# Patient Record
Sex: FEMALE | Race: WHITE | NOT HISPANIC OR LATINO | Employment: FULL TIME | URBAN - METROPOLITAN AREA
[De-identification: names, ages, dates, MRNs, and addresses within clinical notes are randomized per-mention and may not be internally consistent; named-entity substitution may affect disease eponyms.]

---

## 2023-08-22 LAB
ALANINE AMINOTRANSFERASE (SGPT) (U/L) IN SER/PLAS: 19 U/L (ref 7–45)
ALBUMIN (G/DL) IN SER/PLAS: 4.1 G/DL (ref 3.4–5)
ALKALINE PHOSPHATASE (U/L) IN SER/PLAS: 62 U/L (ref 33–110)
ANION GAP IN SER/PLAS: 12 MMOL/L (ref 10–20)
APPEARANCE, URINE: CLEAR
ASPARTATE AMINOTRANSFERASE (SGOT) (U/L) IN SER/PLAS: 20 U/L (ref 9–39)
BACTERIA, URINE: ABNORMAL /HPF
BASOPHILS (10*3/UL) IN BLOOD BY AUTOMATED COUNT: 0.08 X10E9/L (ref 0–0.1)
BASOPHILS/100 LEUKOCYTES IN BLOOD BY AUTOMATED COUNT: 1 % (ref 0–2)
BILIRUBIN TOTAL (MG/DL) IN SER/PLAS: 0.4 MG/DL (ref 0–1.2)
BILIRUBIN, URINE: NEGATIVE
BLOOD, URINE: NEGATIVE
CALCIDIOL (25 OH VITAMIN D3) (NG/ML) IN SER/PLAS: 35 NG/ML
CALCIUM (MG/DL) IN SER/PLAS: 9.3 MG/DL (ref 8.6–10.3)
CARBON DIOXIDE, TOTAL (MMOL/L) IN SER/PLAS: 24 MMOL/L (ref 21–32)
CHLORIDE (MMOL/L) IN SER/PLAS: 106 MMOL/L (ref 98–107)
CHOLESTEROL (MG/DL) IN SER/PLAS: 250 MG/DL (ref 0–199)
CHOLESTEROL IN HDL (MG/DL) IN SER/PLAS: 66.5 MG/DL
CHOLESTEROL/HDL RATIO: 3.8
COBALAMIN (VITAMIN B12) (PG/ML) IN SER/PLAS: 695 PG/ML (ref 211–911)
COLOR, URINE: ABNORMAL
CREATININE (MG/DL) IN SER/PLAS: 0.71 MG/DL (ref 0.5–1.05)
EOSINOPHILS (10*3/UL) IN BLOOD BY AUTOMATED COUNT: 0.1 X10E9/L (ref 0–0.7)
EOSINOPHILS/100 LEUKOCYTES IN BLOOD BY AUTOMATED COUNT: 1.3 % (ref 0–6)
ERYTHROCYTE DISTRIBUTION WIDTH (RATIO) BY AUTOMATED COUNT: 13.5 % (ref 11.5–14.5)
ERYTHROCYTE MEAN CORPUSCULAR HEMOGLOBIN CONCENTRATION (G/DL) BY AUTOMATED: 33.6 G/DL (ref 32–36)
ERYTHROCYTE MEAN CORPUSCULAR VOLUME (FL) BY AUTOMATED COUNT: 97 FL (ref 80–100)
ERYTHROCYTES (10*6/UL) IN BLOOD BY AUTOMATED COUNT: 4.73 X10E12/L (ref 4–5.2)
GFR FEMALE: >90 ML/MIN/1.73M2
GLUCOSE (MG/DL) IN SER/PLAS: 87 MG/DL (ref 74–99)
GLUCOSE, URINE: NEGATIVE MG/DL
HEMATOCRIT (%) IN BLOOD BY AUTOMATED COUNT: 46.1 % (ref 36–46)
HEMOGLOBIN (G/DL) IN BLOOD: 15.5 G/DL (ref 12–16)
IMMATURE GRANULOCYTES/100 LEUKOCYTES IN BLOOD BY AUTOMATED COUNT: 0.1 % (ref 0–0.9)
KETONES, URINE: NEGATIVE MG/DL
LDL: 156 MG/DL (ref 0–99)
LEUKOCYTE ESTERASE, URINE: ABNORMAL
LEUKOCYTES (10*3/UL) IN BLOOD BY AUTOMATED COUNT: 7.9 X10E9/L (ref 4.4–11.3)
LYMPHOCYTES (10*3/UL) IN BLOOD BY AUTOMATED COUNT: 2.46 X10E9/L (ref 1.2–4.8)
LYMPHOCYTES/100 LEUKOCYTES IN BLOOD BY AUTOMATED COUNT: 31.1 % (ref 13–44)
MONOCYTES (10*3/UL) IN BLOOD BY AUTOMATED COUNT: 0.57 X10E9/L (ref 0.1–1)
MONOCYTES/100 LEUKOCYTES IN BLOOD BY AUTOMATED COUNT: 7.2 % (ref 2–10)
NEUTROPHILS (10*3/UL) IN BLOOD BY AUTOMATED COUNT: 4.69 X10E9/L (ref 1.2–7.7)
NEUTROPHILS/100 LEUKOCYTES IN BLOOD BY AUTOMATED COUNT: 59.3 % (ref 40–80)
NITRITE, URINE: NEGATIVE
PH, URINE: 7 (ref 5–8)
PLATELETS (10*3/UL) IN BLOOD AUTOMATED COUNT: 276 X10E9/L (ref 150–450)
POTASSIUM (MMOL/L) IN SER/PLAS: 4.3 MMOL/L (ref 3.5–5.3)
PROTEIN TOTAL: 7.3 G/DL (ref 6.4–8.2)
PROTEIN, URINE: NEGATIVE MG/DL
RBC, URINE: ABNORMAL /HPF (ref 0–5)
SODIUM (MMOL/L) IN SER/PLAS: 138 MMOL/L (ref 136–145)
SPECIFIC GRAVITY, URINE: 1.01 (ref 1–1.03)
SQUAMOUS EPITHELIAL CELLS, URINE: 2 /HPF
THYROTROPIN (MIU/L) IN SER/PLAS BY DETECTION LIMIT <= 0.05 MIU/L: 3.52 MIU/L (ref 0.44–3.98)
TRIGLYCERIDE (MG/DL) IN SER/PLAS: 139 MG/DL (ref 0–149)
UREA NITROGEN (MG/DL) IN SER/PLAS: 16 MG/DL (ref 6–23)
UROBILINOGEN, URINE: <2 MG/DL (ref 0–1.9)
VLDL: 28 MG/DL (ref 0–40)
WBC, URINE: 1 /HPF (ref 0–5)

## 2023-08-23 LAB
ESTIMATED AVERAGE GLUCOSE FOR HBA1C: 97 MG/DL
HEMOGLOBIN A1C/HEMOGLOBIN TOTAL IN BLOOD: 5 %
URINE CULTURE: NORMAL

## 2023-11-28 ENCOUNTER — OFFICE VISIT (OUTPATIENT)
Dept: PRIMARY CARE | Facility: CLINIC | Age: 43
End: 2023-11-28
Payer: COMMERCIAL

## 2023-11-28 VITALS
WEIGHT: 293 LBS | DIASTOLIC BLOOD PRESSURE: 80 MMHG | RESPIRATION RATE: 16 BRPM | SYSTOLIC BLOOD PRESSURE: 118 MMHG | TEMPERATURE: 97.2 F | HEART RATE: 76 BPM

## 2023-11-28 DIAGNOSIS — E78.5 HYPERLIPIDEMIA, UNSPECIFIED HYPERLIPIDEMIA TYPE: ICD-10-CM

## 2023-11-28 DIAGNOSIS — F41.9 ANXIETY: Primary | ICD-10-CM

## 2023-11-28 PROCEDURE — 99213 OFFICE O/P EST LOW 20 MIN: CPT | Performed by: FAMILY MEDICINE

## 2023-11-28 PROCEDURE — 1036F TOBACCO NON-USER: CPT | Performed by: FAMILY MEDICINE

## 2023-11-28 RX ORDER — DULOXETIN HYDROCHLORIDE 60 MG/1
60 CAPSULE, DELAYED RELEASE ORAL DAILY
Qty: 90 CAPSULE | Refills: 1 | Status: SHIPPED | OUTPATIENT
Start: 2023-11-28 | End: 2023-12-27 | Stop reason: SDUPTHER

## 2023-11-28 RX ORDER — DULOXETIN HYDROCHLORIDE 60 MG/1
60 CAPSULE, DELAYED RELEASE ORAL DAILY
COMMUNITY
End: 2023-11-28 | Stop reason: SDUPTHER

## 2023-11-28 RX ORDER — BUPROPION HYDROCHLORIDE 150 MG/1
150 TABLET, EXTENDED RELEASE ORAL DAILY
COMMUNITY
End: 2023-11-28 | Stop reason: SDUPTHER

## 2023-11-28 RX ORDER — BUPROPION HYDROCHLORIDE 150 MG/1
150 TABLET, EXTENDED RELEASE ORAL DAILY
Qty: 90 TABLET | Refills: 1 | Status: SHIPPED | OUTPATIENT
Start: 2023-11-28 | End: 2023-12-27 | Stop reason: SDUPTHER

## 2023-11-28 NOTE — PROGRESS NOTES
Subjective   Patient ID: Kylie Reynolds is a 43 y.o. female who presents for Follow-up (3 mon fu ).  HPI  mostly she has a anxiety anxiety issues and feeling bad.   she is worked out   she owns a gym   her therapist. wanted her to up or change meds.  she has been on the duloxetine Dr. Frances cox.     It has been tough times at home and work , she took most of October off work    Things are smoother now , meds are ok   Rough patch.     Wants to keep meds the same.     Wants to try treatment when things are work are ok.             past hx of low back pain with radiculopathy , for which she had workup including MRI and pain mgt in the past , aqutherapy helped, still following with her chiropractor.     lives with her  , does not have kids.   she has cats   ROS:    Review of Systems    History reviewed. No pertinent past medical history.    History reviewed. No pertinent surgical history.   Social History     Socioeconomic History    Marital status: Unknown     Spouse name: None    Number of children: None    Years of education: None    Highest education level: None   Occupational History    None   Tobacco Use    Smoking status: Never    Smokeless tobacco: Never   Substance and Sexual Activity    Alcohol use: None    Drug use: None    Sexual activity: None   Other Topics Concern    None   Social History Narrative    None     Social Determinants of Health     Financial Resource Strain: Not on file   Food Insecurity: Not on file   Transportation Needs: Not on file   Physical Activity: Not on file   Stress: Not on file   Social Connections: Not on file   Intimate Partner Violence: Not on file   Housing Stability: Not on file      No family history on file.    MEDICATIONS AND ALLERGIES:    ALLERGIES Penicillins    MEDICATIONS   Current Outpatient Medications on File Prior to Visit   Medication Sig Dispense Refill    buPROPion SR (Wellbutrin SR) 150 mg 12 hr tablet Take 1 tablet (150 mg) by mouth once daily.       DULoxetine (Cymbalta) 60 mg DR capsule Take 1 capsule (60 mg) by mouth once daily.       No current facility-administered medications on file prior to visit.        Objective   Visit Vitals  /80   Pulse 76   Temp 36.2 °C (97.2 °F)   Resp 16   Wt 134 kg (295 lb)   Smoking Status Never      Physical Exam  Constitutional:       Appearance: Normal appearance.   HENT:      Head: Normocephalic and atraumatic.   Eyes:      Pupils: Pupils are equal, round, and reactive to light.   Cardiovascular:      Rate and Rhythm: Normal rate.   Pulmonary:      Effort: Pulmonary effort is normal.   Musculoskeletal:         General: No swelling.      Cervical back: Normal range of motion.   Skin:     Coloration: Skin is not jaundiced.   Neurological:      General: No focal deficit present.      Mental Status: She is alert and oriented to person, place, and time.         1. Anxiety  Well controlled current treamtent   Continue current plan   Contact us for any changes.     - buPROPion SR (Wellbutrin SR) 150 mg 12 hr tablet; Take 1 tablet (150 mg) by mouth once daily.  Dispense: 90 tablet; Refill: 1  - DULoxetine (Cymbalta) 60 mg DR capsule; Take 1 capsule (60 mg) by mouth once daily.  Dispense: 90 capsule; Refill: 1      2. Hyperlipidemia, unspecified hyperlipidemia type  Lipids are high   Life style changes diucssed   Follow up in 6 months

## 2023-12-27 DIAGNOSIS — F41.9 ANXIETY: ICD-10-CM

## 2023-12-27 RX ORDER — DULOXETIN HYDROCHLORIDE 60 MG/1
60 CAPSULE, DELAYED RELEASE ORAL DAILY
Qty: 90 CAPSULE | Refills: 1 | Status: SHIPPED | OUTPATIENT
Start: 2023-12-27 | End: 2024-06-24

## 2023-12-27 RX ORDER — BUPROPION HYDROCHLORIDE 150 MG/1
150 TABLET, EXTENDED RELEASE ORAL DAILY
Qty: 90 TABLET | Refills: 1 | Status: SHIPPED | OUTPATIENT
Start: 2023-12-27 | End: 2024-06-24

## 2024-05-29 ENCOUNTER — OFFICE VISIT (OUTPATIENT)
Dept: PRIMARY CARE | Facility: CLINIC | Age: 44
End: 2024-05-29
Payer: COMMERCIAL

## 2024-05-29 VITALS
OXYGEN SATURATION: 97 % | SYSTOLIC BLOOD PRESSURE: 118 MMHG | BODY MASS INDEX: 47.09 KG/M2 | DIASTOLIC BLOOD PRESSURE: 76 MMHG | HEIGHT: 66 IN | HEART RATE: 73 BPM | WEIGHT: 293 LBS

## 2024-05-29 DIAGNOSIS — F41.9 ANXIETY: ICD-10-CM

## 2024-05-29 DIAGNOSIS — Z23 NEED FOR DIPHTHERIA-TETANUS-PERTUSSIS (TDAP) VACCINE: ICD-10-CM

## 2024-05-29 DIAGNOSIS — Z00.00 PHYSICAL EXAM: Primary | ICD-10-CM

## 2024-05-29 PROBLEM — E66.9 OBESITY: Status: ACTIVE | Noted: 2022-12-06

## 2024-05-29 PROBLEM — M47.816 LUMBAR SPONDYLOSIS: Status: ACTIVE | Noted: 2022-12-06

## 2024-05-29 PROCEDURE — 90715 TDAP VACCINE 7 YRS/> IM: CPT | Performed by: FAMILY MEDICINE

## 2024-05-29 PROCEDURE — 90471 IMMUNIZATION ADMIN: CPT | Performed by: FAMILY MEDICINE

## 2024-05-29 PROCEDURE — 99396 PREV VISIT EST AGE 40-64: CPT | Performed by: FAMILY MEDICINE

## 2024-05-29 RX ORDER — DULOXETIN HYDROCHLORIDE 30 MG/1
30 CAPSULE, DELAYED RELEASE ORAL NIGHTLY
Qty: 30 CAPSULE | Refills: 11 | Status: SHIPPED | OUTPATIENT
Start: 2024-05-29 | End: 2025-05-29

## 2024-05-29 ASSESSMENT — PROMIS GLOBAL HEALTH SCALE
RATE_GENERAL_HEALTH: VERY GOOD
EMOTIONAL_PROBLEMS: SOMETIMES
RATE_AVERAGE_PAIN: 3
RATE_MENTAL_HEALTH: EXCELLENT
CARRYOUT_SOCIAL_ACTIVITIES: EXCELLENT
RATE_AVERAGE_FATIGUE: MODERATE
RATE_SOCIAL_SATISFACTION: EXCELLENT
CARRYOUT_PHYSICAL_ACTIVITIES: COMPLETELY
RATE_QUALITY_OF_LIFE: VERY GOOD
RATE_PHYSICAL_HEALTH: FAIR

## 2024-05-29 NOTE — PROGRESS NOTES
Subjective   Patient ID: Kylie Reynolds is a 44 y.o. female who presents for Annual Exam.  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      She is still seeing her therapist.     She politely refused pregnancy test     No hx of cancer in the family.   Her paternal aunt had breast cancer.     She does not have a gynecologist.     No smoking   Occasional alcohol , last drink last year.         Review of Systems    History reviewed. No pertinent past medical history.    History reviewed. No pertinent surgical history.   Social History     Socioeconomic History    Marital status: Unknown     Spouse name: None    Number of children: None    Years of education: None    Highest education level: None   Occupational History    None   Tobacco Use    Smoking status: Never     Passive exposure: Never    Smokeless tobacco: Never   Substance and Sexual Activity    Alcohol use: Not Currently    Drug use: Never    Sexual activity: None   Other Topics Concern    None   Social History Narrative    None     Social Determinants of Health     Financial Resource Strain: Not on file   Food Insecurity: Not on file   Transportation Needs: Not on file   Physical Activity: Not on file   Stress: Not on file   Social Connections: Not on file   Intimate Partner Violence: Not on file   Housing Stability: Not on file      No family history on file.    MEDICATIONS AND ALLERGIES:    ALLERGIES Penicillins    MEDICATIONS   Current Outpatient Medications on File Prior to Visit   Medication Sig Dispense Refill    buPROPion SR (Wellbutrin SR) 150 mg 12 hr tablet Take 1 tablet (150 mg) by mouth once daily. 90 tablet 1    DULoxetine (Cymbalta) 60 mg DR capsule Take 1 capsule (60 mg) by mouth once daily. 90 capsule 1     No current facility-administered medications on file prior to visit.        Objective   Visit  "Vitals  /76   Pulse 73   Ht 1.676 m (5' 6\")   Wt 135 kg (297 lb)   SpO2 97%   BMI 47.94 kg/m²   Smoking Status Never   BSA 2.51 m²          11/28/2023     2:38 PM 5/29/2024     2:11 PM   Vitals   Systolic 118 118   Diastolic 80 76   Heart Rate 76 73   Temp 36.2 °C (97.2 °F)    Resp 16    Height (in)  1.676 m (5' 6\")   Weight (lb) 295 297   BMI  47.94 kg/m2   BSA (m2)  2.51 m2   Visit Report Report Report     Physical Exam  Constitutional:       Appearance: Normal appearance. She is normal weight.   HENT:      Head: Normocephalic.      Right Ear: Tympanic membrane normal.      Left Ear: Tympanic membrane normal.      Nose: Nose normal.      Mouth/Throat:      Pharynx: Oropharynx is clear.   Eyes:      Pupils: Pupils are equal, round, and reactive to light.   Cardiovascular:      Rate and Rhythm: Normal rate and regular rhythm.      Pulses: Normal pulses.      Heart sounds: Normal heart sounds.   Pulmonary:      Effort: Pulmonary effort is normal.      Breath sounds: Normal breath sounds. No stridor. No rhonchi.   Musculoskeletal:         General: No swelling or tenderness.   Skin:     Coloration: Skin is not jaundiced or pale.   Neurological:      General: No focal deficit present.      Mental Status: She is alert and oriented to person, place, and time. Mental status is at baseline.      Cranial Nerves: No cranial nerve deficit.      Sensory: No sensory deficit.      Motor: No weakness.      Coordination: Coordination normal.      Gait: Gait normal.      Deep Tendon Reflexes: Reflexes normal.   Psychiatric:         Mood and Affect: Mood normal.         Behavior: Behavior normal.         Thought Content: Thought content normal.         Judgment: Judgment normal.             1. Physical exam  Exam is unremarkable at this time.   Continue healthy diet and exercise, we discussed healthy lifestyle, patient is physically active however not as much as she wants due to limitation from back pain.  She was able to lose " a lot of weight previously by lifestyle changes and exercise  Routine immunizations as shown above   Influenza: advised yearly         Limiting use of alcohol, reduce or abstain from tobacco use, abstain from substance abuse    Procedures -  Continue with routine eye exams  Continue with routine dental exams  Continue with routine Dermatology / Skin checks  Continue with routine self-breast exams  Colonoscopy due:   Mammgram due:   Pap Smear / GYN due: We advised that she schedule with a gynecologist for a Pap smear, she politely refused pregnancy test today   low dose lung CT in patients who have smoked for 30 years: Not applicable    - TSH with reflex to Free T4 if abnormal; Future  - CBC and Auto Differential; Future  - Comprehensive Metabolic Panel; Future  - Hemoglobin A1C; Future  - Lipid Panel; Future    2. Anxiety  She is changing her job situation which contributes to her anxiety she is tolerating duloxetine without major side effects, will increase the dose to 60 mg in the morning and 30 mg in the afternoon  - DULoxetine (Cymbalta) 30 mg DR capsule; Take 1 capsule (30 mg) by mouth once daily at bedtime. Take in addition to the 60mg dose, total 90mg in 24 hours. Do not crush or chew.  Dispense: 30 capsule; Refill: 11    3. Need for diphtheria-tetanus-pertussis (Tdap) vaccine  Tdap given today  - Tdap vaccine, age 7 years and older (ADACEL)

## 2024-06-04 ENCOUNTER — LAB (OUTPATIENT)
Dept: LAB | Facility: LAB | Age: 44
End: 2024-06-04
Payer: COMMERCIAL

## 2024-06-04 DIAGNOSIS — Z00.00 PHYSICAL EXAM: ICD-10-CM

## 2024-06-04 LAB
ALBUMIN SERPL BCP-MCNC: 3.7 G/DL (ref 3.4–5)
ALP SERPL-CCNC: 63 U/L (ref 33–110)
ALT SERPL W P-5'-P-CCNC: 21 U/L (ref 7–45)
ANION GAP SERPL CALC-SCNC: 10 MMOL/L (ref 10–20)
AST SERPL W P-5'-P-CCNC: 22 U/L (ref 9–39)
BASOPHILS # BLD AUTO: 0.07 X10*3/UL (ref 0–0.1)
BASOPHILS NFR BLD AUTO: 1.1 %
BILIRUB SERPL-MCNC: 0.6 MG/DL (ref 0–1.2)
BUN SERPL-MCNC: 14 MG/DL (ref 6–23)
CALCIUM SERPL-MCNC: 8.6 MG/DL (ref 8.6–10.3)
CHLORIDE SERPL-SCNC: 105 MMOL/L (ref 98–107)
CHOLEST SERPL-MCNC: 229 MG/DL (ref 0–199)
CHOLESTEROL/HDL RATIO: 3.5
CO2 SERPL-SCNC: 27 MMOL/L (ref 21–32)
CREAT SERPL-MCNC: 0.7 MG/DL (ref 0.5–1.05)
EGFRCR SERPLBLD CKD-EPI 2021: >90 ML/MIN/1.73M*2
EOSINOPHIL # BLD AUTO: 0.08 X10*3/UL (ref 0–0.7)
EOSINOPHIL NFR BLD AUTO: 1.2 %
ERYTHROCYTE [DISTWIDTH] IN BLOOD BY AUTOMATED COUNT: 14.1 % (ref 11.5–14.5)
GLUCOSE SERPL-MCNC: 82 MG/DL (ref 74–99)
HCT VFR BLD AUTO: 43 % (ref 36–46)
HDLC SERPL-MCNC: 65.7 MG/DL
HGB BLD-MCNC: 14.4 G/DL (ref 12–16)
IMM GRANULOCYTES # BLD AUTO: 0.01 X10*3/UL (ref 0–0.7)
IMM GRANULOCYTES NFR BLD AUTO: 0.2 % (ref 0–0.9)
LDLC SERPL CALC-MCNC: 150 MG/DL
LYMPHOCYTES # BLD AUTO: 2.41 X10*3/UL (ref 1.2–4.8)
LYMPHOCYTES NFR BLD AUTO: 37.2 %
MCH RBC QN AUTO: 32.7 PG (ref 26–34)
MCHC RBC AUTO-ENTMCNC: 33.5 G/DL (ref 32–36)
MCV RBC AUTO: 98 FL (ref 80–100)
MONOCYTES # BLD AUTO: 0.51 X10*3/UL (ref 0.1–1)
MONOCYTES NFR BLD AUTO: 7.9 %
NEUTROPHILS # BLD AUTO: 3.39 X10*3/UL (ref 1.2–7.7)
NEUTROPHILS NFR BLD AUTO: 52.4 %
NON HDL CHOLESTEROL: 163 MG/DL (ref 0–149)
NRBC BLD-RTO: 0 /100 WBCS (ref 0–0)
PLATELET # BLD AUTO: 238 X10*3/UL (ref 150–450)
POTASSIUM SERPL-SCNC: 4 MMOL/L (ref 3.5–5.3)
PROT SERPL-MCNC: 6.6 G/DL (ref 6.4–8.2)
RBC # BLD AUTO: 4.4 X10*6/UL (ref 4–5.2)
SODIUM SERPL-SCNC: 138 MMOL/L (ref 136–145)
TRIGL SERPL-MCNC: 66 MG/DL (ref 0–149)
TSH SERPL-ACNC: 3.38 MIU/L (ref 0.44–3.98)
VLDL: 13 MG/DL (ref 0–40)
WBC # BLD AUTO: 6.5 X10*3/UL (ref 4.4–11.3)

## 2024-06-04 PROCEDURE — 80061 LIPID PANEL: CPT

## 2024-06-04 PROCEDURE — 36415 COLL VENOUS BLD VENIPUNCTURE: CPT

## 2024-06-04 PROCEDURE — 84443 ASSAY THYROID STIM HORMONE: CPT

## 2024-06-04 PROCEDURE — 83036 HEMOGLOBIN GLYCOSYLATED A1C: CPT

## 2024-06-04 PROCEDURE — 85025 COMPLETE CBC W/AUTO DIFF WBC: CPT

## 2024-06-04 PROCEDURE — 80053 COMPREHEN METABOLIC PANEL: CPT

## 2024-06-05 LAB
EST. AVERAGE GLUCOSE BLD GHB EST-MCNC: 91 MG/DL
HBA1C MFR BLD: 4.8 %

## 2024-06-21 DIAGNOSIS — F41.9 ANXIETY: ICD-10-CM

## 2024-06-21 RX ORDER — BUPROPION HYDROCHLORIDE 150 MG/1
150 TABLET, EXTENDED RELEASE ORAL DAILY
Qty: 90 TABLET | Refills: 1 | Status: SHIPPED | OUTPATIENT
Start: 2024-06-21 | End: 2024-12-18

## 2024-06-27 DIAGNOSIS — F41.9 ANXIETY: ICD-10-CM

## 2024-06-27 RX ORDER — DULOXETIN HYDROCHLORIDE 60 MG/1
60 CAPSULE, DELAYED RELEASE ORAL DAILY
Qty: 90 CAPSULE | Refills: 1 | Status: SHIPPED | OUTPATIENT
Start: 2024-06-27 | End: 2024-12-24

## 2024-06-27 NOTE — TELEPHONE ENCOUNTER
DULoxetine (Cymbalta) 60 mg DR capsule     Pt got refills of a 30mg and not a 60mg.     [address]

## 2024-08-23 ENCOUNTER — APPOINTMENT (OUTPATIENT)
Dept: PRIMARY CARE | Facility: CLINIC | Age: 44
End: 2024-08-23
Payer: COMMERCIAL

## 2024-08-23 ENCOUNTER — OFFICE VISIT (OUTPATIENT)
Dept: PRIMARY CARE | Facility: CLINIC | Age: 44
End: 2024-08-23
Payer: COMMERCIAL

## 2024-08-23 VITALS
SYSTOLIC BLOOD PRESSURE: 128 MMHG | BODY MASS INDEX: 49.55 KG/M2 | TEMPERATURE: 96.9 F | HEART RATE: 79 BPM | RESPIRATION RATE: 16 BRPM | WEIGHT: 293 LBS | DIASTOLIC BLOOD PRESSURE: 88 MMHG

## 2024-08-23 DIAGNOSIS — S16.1XXA STRAIN OF NECK MUSCLE, INITIAL ENCOUNTER: Primary | ICD-10-CM

## 2024-08-23 PROCEDURE — 1036F TOBACCO NON-USER: CPT | Performed by: FAMILY MEDICINE

## 2024-08-23 PROCEDURE — 99213 OFFICE O/P EST LOW 20 MIN: CPT | Performed by: FAMILY MEDICINE

## 2024-08-23 RX ORDER — MELOXICAM 15 MG/1
15 TABLET ORAL DAILY
Qty: 30 TABLET | Refills: 0 | Status: SHIPPED | OUTPATIENT
Start: 2024-08-23 | End: 2025-08-23

## 2024-08-23 RX ORDER — TIZANIDINE 2 MG/1
2 TABLET ORAL EVERY 6 HOURS PRN
Qty: 30 TABLET | Refills: 0 | Status: SHIPPED | OUTPATIENT
Start: 2024-08-23 | End: 2024-09-02

## 2024-08-23 ASSESSMENT — ENCOUNTER SYMPTOMS: MUSCULOSKELETAL PAIN: 1

## 2024-08-23 NOTE — PATIENT INSTRUCTIONS
USE [employer].  CALL FOR NEEDS [phone].   KEEP ON MEDICATIONS  KEEP SPECIALTY APPOINTMENTS.

## 2024-08-23 NOTE — PROGRESS NOTES
Subjective   Patient ID: Kylie Reynolds is a 44 y.o. female who presents for Muscle Pain (Neck pain that radiates to the left arm for about 2 weeks /).  Muscle Pain      Muscle Pain (Neck pain that radiates to the left arm for about 2 weeks /).  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    RELIEVERS: HEATING PAD.  NUMBNESS NEVER STOPS.    SXS COMES AND GO FOR YEARS.    TINGLES DOWN LUE.        Review of Systems   All other systems reviewed and are negative.    MENSES JUST ENDED     Objective   Physical Exam  Vitals and nursing note reviewed.   Constitutional:       Appearance: Normal appearance.   HENT:      Head: Normocephalic.      Right Ear: Tympanic membrane, ear canal and external ear normal.      Left Ear: Tympanic membrane, ear canal and external ear normal.      Nose: Nose normal.      Mouth/Throat:      Mouth: Mucous membranes are moist.      Pharynx: Oropharynx is clear.   Eyes:      Conjunctiva/sclera: Conjunctivae normal.      Pupils: Pupils are equal, round, and reactive to light.   Cardiovascular:      Rate and Rhythm: Normal rate and regular rhythm.      Pulses: Normal pulses.      Heart sounds: Normal heart sounds.   Pulmonary:      Effort: Pulmonary effort is normal.      Breath sounds: Normal breath sounds.   Abdominal:      General: Bowel sounds are normal.      Palpations: Abdomen is soft.   Musculoskeletal:         General: Normal range of motion.      Cervical back: Normal range of motion and neck supple.      Comments: MM SPASMS LEFT TRAPEZIUS AND TIGHT BRA STRAP MID BELLY OF MUSCLE.   Skin:     General: Skin is warm and dry.   Neurological:      General: No focal deficit present.      Mental Status: Mental status is at baseline.   Psychiatric:         Mood and Affect: Mood normal.         Behavior: Behavior normal.         Thought Content: Thought content normal.         Judgment: Judgment normal.         Assessment/Plan   Diagnoses and all orders for this visit:  Strain of neck muscle, initial  encounter  -     meloxicam (Mobic) 15 mg tablet; Take 1 tablet (15 mg) by mouth once daily. TAKE WITH FOOD ONCE A DAY.  NO OTHER NSAID WITH THIS NSAID.  -     tiZANidine (Zanaflex) 2 mg tablet; Take 1 tablet (2 mg) by mouth every 6 hours if needed for muscle spasms for up to 10 days.  -     Referral to Physical Therapy; Future             .VS

## 2024-08-27 DIAGNOSIS — S16.1XXA STRAIN OF NECK MUSCLE, INITIAL ENCOUNTER: ICD-10-CM

## 2024-08-27 RX ORDER — TIZANIDINE 2 MG/1
2 TABLET ORAL EVERY 6 HOURS PRN
Qty: 30 TABLET | Refills: 0 | Status: SHIPPED | OUTPATIENT
Start: 2024-08-27

## 2024-09-04 ENCOUNTER — APPOINTMENT (OUTPATIENT)
Dept: PHYSICAL THERAPY | Facility: CLINIC | Age: 44
End: 2024-09-04
Payer: COMMERCIAL

## 2024-09-04 DIAGNOSIS — S16.1XXA CERVICAL STRAIN: Primary | ICD-10-CM

## 2024-09-04 PROCEDURE — 97162 PT EVAL MOD COMPLEX 30 MIN: CPT | Performed by: PHYSICAL THERAPIST

## 2024-09-04 PROCEDURE — 97110 THERAPEUTIC EXERCISES: CPT | Performed by: PHYSICAL THERAPIST

## 2024-09-04 NOTE — PROGRESS NOTES
Physical Therapy     Physical Therapy Evaluation and Treatment      Patient Name: Kylie Reynolds 1980   Encounter Diagnosis   Name Primary?    Cervical strain Yes        THERAPY VISIT NUMBER:   1    Today's Date: 9-4-24    REFERRING DR. COKER     SUBJECTIVE:        NECK PAIN AND NUMBNESS -LEFT ARM     GOALS: LESS NECK PAIN AND NUMBNESS     OBJECTIVE:   C-ROM --DECREASED 40 PERCENT WITH NECK PAIN -6/10     ASSESSMENT: NECK DEBILITY    PLAN AND TREATMENT:  SEE FLOW SHEET PROGRAM IN CHART:    1 X WEEKLY

## 2024-09-10 ENCOUNTER — APPOINTMENT (OUTPATIENT)
Dept: PHYSICAL THERAPY | Facility: CLINIC | Age: 44
End: 2024-09-10
Payer: COMMERCIAL

## 2024-09-10 DIAGNOSIS — S16.1XXD STRAIN OF NECK MUSCLE, SUBSEQUENT ENCOUNTER: Primary | ICD-10-CM

## 2024-09-10 PROCEDURE — 97014 ELECTRIC STIMULATION THERAPY: CPT | Performed by: PHYSICAL THERAPIST

## 2024-09-10 PROCEDURE — 97110 THERAPEUTIC EXERCISES: CPT | Performed by: PHYSICAL THERAPIST

## 2024-09-10 PROCEDURE — 97140 MANUAL THERAPY 1/> REGIONS: CPT | Performed by: PHYSICAL THERAPIST

## 2024-09-10 PROCEDURE — 97035 APP MDLTY 1+ULTRASOUND EA 15: CPT | Performed by: PHYSICAL THERAPIST

## 2024-09-10 NOTE — PROGRESS NOTES
Physical Therapy      Physical Therapy Treatment      Patient Name: Kylie Reynolds     MRN:[id number]      Today's Date: 09/10/24      REFERRING DR Miradna      SUBJECTIVE:  wants exercises, she is seeing a chiropractor and a dry needling specialist as  well as PT             GOALS:  painfree L neck/shoulder           OBJECTIVE:  full tx see exercise flowsheet, added HEP and new exs today chin tucks, levator scapula stretch, cervical sidebending stretch.             ASSESSMENT: pt doing well, went over new exs, will try over next week and then we will reassess or add to her HEP, next week with band exs .           PLAN/TREATMENT/VISIT:    continue 1 x weekly

## 2024-09-17 ENCOUNTER — APPOINTMENT (OUTPATIENT)
Dept: PHYSICAL THERAPY | Facility: CLINIC | Age: 44
End: 2024-09-17
Payer: COMMERCIAL

## 2024-09-17 DIAGNOSIS — S16.1XXD STRAIN OF NECK MUSCLE, SUBSEQUENT ENCOUNTER: Primary | ICD-10-CM

## 2024-09-17 PROCEDURE — 97140 MANUAL THERAPY 1/> REGIONS: CPT | Performed by: PHYSICAL THERAPIST

## 2024-09-17 PROCEDURE — 97035 APP MDLTY 1+ULTRASOUND EA 15: CPT | Performed by: PHYSICAL THERAPIST

## 2024-09-17 PROCEDURE — 97110 THERAPEUTIC EXERCISES: CPT | Performed by: PHYSICAL THERAPIST

## 2024-09-17 PROCEDURE — 97014 ELECTRIC STIMULATION THERAPY: CPT | Performed by: PHYSICAL THERAPIST

## 2024-09-17 NOTE — PROGRESS NOTES
Physical Therapy      Physical Therapy Treatment      Patient Name: Kylie Reynolds     MRN:[id number]      Today's Date: 09/17/24      REFERRING DR Miranda      SUBJECTIVE:  pt feels she is doing better and she must do exercises daily for the rest of her life            GOALS:  painfree neck           OBJECTIVE:  full tx see exercise flowsheet   Added rows/high rows and pulldowns, set up her tens unit to right setting. Used her blue theratubing for her exercises today.             ASSESSMENT: pt doing well, she is motivated and is following thru with HEP and will as well with new exercises.           PLAN/TREATMENT/VISIT: no further visits planned she sees dr sifuentes next week and then will check back with us.

## 2024-09-23 ENCOUNTER — APPOINTMENT (OUTPATIENT)
Dept: PRIMARY CARE | Facility: CLINIC | Age: 44
End: 2024-09-23
Payer: COMMERCIAL

## 2024-09-23 VITALS
WEIGHT: 293 LBS | BODY MASS INDEX: 47.09 KG/M2 | HEART RATE: 72 BPM | TEMPERATURE: 97.6 F | DIASTOLIC BLOOD PRESSURE: 80 MMHG | SYSTOLIC BLOOD PRESSURE: 137 MMHG | OXYGEN SATURATION: 97 % | HEIGHT: 66 IN

## 2024-09-23 DIAGNOSIS — Z23 NEEDS FLU SHOT: ICD-10-CM

## 2024-09-23 DIAGNOSIS — S16.1XXA STRAIN OF NECK MUSCLE, INITIAL ENCOUNTER: Primary | ICD-10-CM

## 2024-09-23 PROCEDURE — 99214 OFFICE O/P EST MOD 30 MIN: CPT | Performed by: FAMILY MEDICINE

## 2024-09-23 PROCEDURE — 90656 IIV3 VACC NO PRSV 0.5 ML IM: CPT | Performed by: FAMILY MEDICINE

## 2024-09-23 PROCEDURE — 1036F TOBACCO NON-USER: CPT | Performed by: FAMILY MEDICINE

## 2024-09-23 PROCEDURE — 3008F BODY MASS INDEX DOCD: CPT | Performed by: FAMILY MEDICINE

## 2024-09-23 PROCEDURE — 90471 IMMUNIZATION ADMIN: CPT | Performed by: FAMILY MEDICINE

## 2024-09-23 RX ORDER — CYCLOBENZAPRINE HCL 5 MG
TABLET ORAL
Qty: 30 TABLET | Refills: 0 | Status: SHIPPED | OUTPATIENT
Start: 2024-09-23

## 2024-09-23 NOTE — PATIENT INSTRUCTIONS
TRIAL OF FLEXERIL/CYCLOBENZAPRINE 5 OR 10 MG DAILY TO THREE TIMES IN A DAY IF NEEDED.    GET XRAY OF NECK.  MAY COMPLETE AT [facility].    WE STOPPED MELOXICAM.      USE [employer].  CALL FOR NEEDS [phone].   KEEP ON MEDICATIONS  KEEP SPECIALTY APPOINTMENTS.

## 2024-09-23 NOTE — PROGRESS NOTES
Subjective   Patient ID: Kylie Reynolds is a 44 y.o. female who presents for Follow-up (Follow up on pain ).  HPI  FLUP PAIN SXS WHICH ARE NOW BETTER:  IT TOOK A WHILE.    ESTHER DID NOT GET THE MEDS UNTIL PM MONDAY THEN GOT THINGS WEDNESDAY AFTER TRAVELING.    FEELS THE MM RELAXER WAS NOT STRONG ENOUGH: TIZANIDINE 2 MG MAX SHE TRIED WAS 4 MG.     ALSO TO PHYS TX AND VERY SIMILAR TO THE [facility].    ARM TINGLING SYMPTOMS: TOUCHING TRIGGERS THE PAIN.     NEEDS IMAGING OF THE NECK FOR TINGLING LUE TO THUMB RADICULAR DISTRIBUTION.      Review of Systems   All other systems reviewed and are negative.      Objective   Physical Exam  Vitals and nursing note reviewed.   Constitutional:       Appearance: Normal appearance. She is obese.      Comments: SYMMETRIC STR AND REFLEXES NO IMPINGEMENT AT TRAPEZIUS.     HENT:      Head: Normocephalic.      Right Ear: Tympanic membrane, ear canal and external ear normal.      Left Ear: Tympanic membrane, ear canal and external ear normal.      Nose: Nose normal.      Mouth/Throat:      Mouth: Mucous membranes are moist.      Pharynx: Oropharynx is clear.   Eyes:      Conjunctiva/sclera: Conjunctivae normal.      Pupils: Pupils are equal, round, and reactive to light.   Cardiovascular:      Rate and Rhythm: Normal rate and regular rhythm.      Pulses: Normal pulses.      Heart sounds: Normal heart sounds.   Pulmonary:      Effort: Pulmonary effort is normal.      Breath sounds: Normal breath sounds.   Abdominal:      General: Bowel sounds are normal.      Palpations: Abdomen is soft.   Musculoskeletal:         General: Normal range of motion.      Cervical back: Normal range of motion and neck supple.   Skin:     General: Skin is warm and dry.   Neurological:      General: No focal deficit present.      Mental Status: Mental status is at baseline.   Psychiatric:         Mood and Affect: Mood normal.         Behavior: Behavior normal.         Thought Content: Thought content normal.          Judgment: Judgment normal.         Assessment/Plan              .VS

## 2024-10-23 ENCOUNTER — APPOINTMENT (OUTPATIENT)
Dept: PRIMARY CARE | Facility: CLINIC | Age: 44
End: 2024-10-23
Payer: COMMERCIAL

## 2024-12-04 ENCOUNTER — APPOINTMENT (OUTPATIENT)
Dept: PRIMARY CARE | Facility: CLINIC | Age: 44
End: 2024-12-04
Payer: COMMERCIAL

## 2024-12-04 VITALS
WEIGHT: 293 LBS | OXYGEN SATURATION: 97 % | HEART RATE: 68 BPM | BODY MASS INDEX: 47.09 KG/M2 | HEIGHT: 66 IN | DIASTOLIC BLOOD PRESSURE: 78 MMHG | SYSTOLIC BLOOD PRESSURE: 112 MMHG

## 2024-12-04 DIAGNOSIS — E66.01 CLASS 3 SEVERE OBESITY WITH BODY MASS INDEX (BMI) OF 45.0 TO 49.9 IN ADULT, UNSPECIFIED OBESITY TYPE, UNSPECIFIED WHETHER SERIOUS COMORBIDITY PRESENT: Primary | ICD-10-CM

## 2024-12-04 DIAGNOSIS — F41.9 ANXIETY: ICD-10-CM

## 2024-12-04 DIAGNOSIS — E66.813 CLASS 3 SEVERE OBESITY WITH BODY MASS INDEX (BMI) OF 45.0 TO 49.9 IN ADULT, UNSPECIFIED OBESITY TYPE, UNSPECIFIED WHETHER SERIOUS COMORBIDITY PRESENT: Primary | ICD-10-CM

## 2024-12-04 PROCEDURE — 1036F TOBACCO NON-USER: CPT | Performed by: FAMILY MEDICINE

## 2024-12-04 PROCEDURE — 99213 OFFICE O/P EST LOW 20 MIN: CPT | Performed by: FAMILY MEDICINE

## 2024-12-04 PROCEDURE — 3008F BODY MASS INDEX DOCD: CPT | Performed by: FAMILY MEDICINE

## 2024-12-04 RX ORDER — BUPROPION HYDROCHLORIDE 150 MG/1
150 TABLET, EXTENDED RELEASE ORAL DAILY
Qty: 90 TABLET | Refills: 1 | Status: SHIPPED | OUTPATIENT
Start: 2024-12-04 | End: 2025-06-02

## 2024-12-04 RX ORDER — DULOXETIN HYDROCHLORIDE 60 MG/1
60 CAPSULE, DELAYED RELEASE ORAL DAILY
Qty: 90 CAPSULE | Refills: 1 | Status: SHIPPED | OUTPATIENT
Start: 2024-12-04 | End: 2025-06-02

## 2024-12-04 NOTE — PROGRESS NOTES
"Subjective   Patient ID: Kylie Reynolds is a 44 y.o. female who presents for Follow-up.  HPI  No changes since the summer   Feeling well   Medications are helping  Quit her job , feels better after stopping her job.   More energy since she left her job   Feeling better.   Weight has been lifted.   Part time  now.   Will be full time in February.   Up to [facility].     Her  works in [employer].             Review of Systems    History reviewed. No pertinent past medical history.    History reviewed. No pertinent surgical history.   Social History     Socioeconomic History    Marital status:    Tobacco Use    Smoking status: Never     Passive exposure: Never    Smokeless tobacco: Never   Substance and Sexual Activity    Alcohol use: Not Currently    Drug use: Never      No family history on file.    MEDICATIONS AND ALLERGIES:    ALLERGIES Penicillins    MEDICATIONS   Current Outpatient Medications on File Prior to Visit   Medication Sig Dispense Refill    buPROPion SR (Wellbutrin SR) 150 mg 12 hr tablet Take 1 tablet (150 mg) by mouth once daily. 90 tablet 1    DULoxetine (Cymbalta) 30 mg DR capsule Take 1 capsule (30 mg) by mouth once daily at bedtime. Take in addition to the 60mg dose, total 90mg in 24 hours. Do not crush or chew. 30 capsule 11    DULoxetine (Cymbalta) 60 mg DR capsule Take 1 capsule (60 mg) by mouth once daily. 90 capsule 1    cyclobenzaprine (Flexeril) 5 mg tablet TAKE 1 TO 2 TABS [5 TO 10 MG PER DOSE] PO PRN FOR MUSCLE SPASM RELIEF ONCE TO THREE TIMES DAILY. 30 tablet 0     No current facility-administered medications on file prior to visit.              Objective   Visit Vitals  /78 (BP Location: Right arm, Patient Position: Sitting, BP Cuff Size: Large adult)   Pulse 68   Ht 1.676 m (5' 6\")   Wt 137 kg (302 lb)   SpO2 97%   BMI 48.74 kg/m²   Smoking Status Never   BSA 2.53 m²          11/28/2023     2:38 PM 5/29/2024     2:11 PM 8/23/2024    11:33 AM 9/23/2024 " "   12:54 PM 12/4/2024    12:56 PM   Vitals   Systolic 118 118 128 137 112   Diastolic 80 76 88 80 78   BP Location   Left arm  Right arm   Heart Rate 76 73 79 72 68   Temp 36.2 °C (97.2 °F)  36.1 °C (96.9 °F) 36.4 °C (97.6 °F)    Resp 16  16     Height  1.676 m (5' 6\")  1.676 m (5' 6\") 1.676 m (5' 6\")   Weight (lb) 295 297 307 303 302   BMI  47.94 kg/m2 49.55 kg/m2 48.91 kg/m2 48.74 kg/m2   BSA (m2)  2.51 m2 2.54 m2 2.53 m2 2.53 m2   Visit Report Report Report Report Report Report     Physical Exam    Constitutional: awake; alert, interactive; in no acute distress; well nourished and well developed  ENT: ears and nose were normal in appearance;  Eyes: pupils equal and round  Pulmonary: no respiratory distress and normal respiratory rhythm and effort  Skin: normal skin color and pigmentation;  Psychiatric: oriented to person, place, and time; affect was normal and the mood was normal       Assessment & Plan  Anxiety  Stable   Better after stopping the gym     Orders:    buPROPion SR (Wellbutrin SR) 150 mg 12 hr tablet; Take 1 tablet (150 mg) by mouth once daily.    DULoxetine (Cymbalta) 60 mg DR capsule; Take 1 capsule (60 mg) by mouth once daily.    Class 3 severe obesity with body mass index (BMI) of 45.0 to 49.9 in adult, unspecified obesity type, unspecified whether serious comorbidity present  Working on her diet   Feels better   Motivated to loose weight.   Wants to try life style changing   Anticipating move closer to [facility].          Recommend to get GYN exam , wants to do that when she moves.            "

## 2024-12-04 NOTE — ASSESSMENT & PLAN NOTE
Working on her diet   Feels better   Motivated to loose weight.   Wants to try life style changing   Anticipating move closer to [facility] area.

## 2025-06-29 DIAGNOSIS — F41.9 ANXIETY: ICD-10-CM

## 2025-06-30 RX ORDER — DULOXETIN HYDROCHLORIDE 60 MG/1
60 CAPSULE, DELAYED RELEASE ORAL DAILY
Qty: 90 CAPSULE | Refills: 1 | Status: SHIPPED | OUTPATIENT
Start: 2025-06-30

## 2025-06-30 RX ORDER — BUPROPION HYDROCHLORIDE 150 MG/1
150 TABLET, EXTENDED RELEASE ORAL DAILY
Qty: 90 TABLET | Refills: 1 | Status: SHIPPED | OUTPATIENT
Start: 2025-06-30